# Patient Record
Sex: MALE | Race: OTHER | Employment: FULL TIME | URBAN - METROPOLITAN AREA
[De-identification: names, ages, dates, MRNs, and addresses within clinical notes are randomized per-mention and may not be internally consistent; named-entity substitution may affect disease eponyms.]

---

## 2018-01-06 ENCOUNTER — HOSPITAL ENCOUNTER (OUTPATIENT)
Age: 38
Discharge: HOME | End: 2018-01-06
Payer: COMMERCIAL

## 2018-01-06 VITALS
RESPIRATION RATE: 18 BRPM | OXYGEN SATURATION: 98 % | HEART RATE: 108 BPM | HEIGHT: 66 IN | DIASTOLIC BLOOD PRESSURE: 93 MMHG | WEIGHT: 185 LBS | BODY MASS INDEX: 29.73 KG/M2 | TEMPERATURE: 98 F | SYSTOLIC BLOOD PRESSURE: 135 MMHG

## 2018-01-06 DIAGNOSIS — J11.1 INFLUENZA: Primary | ICD-10-CM

## 2018-01-06 PROCEDURE — 94640 AIRWAY INHALATION TREATMENT: CPT

## 2018-01-06 PROCEDURE — 99214 OFFICE O/P EST MOD 30 MIN: CPT

## 2018-01-06 RX ORDER — ALBUTEROL SULFATE 90 UG/1
2 AEROSOL, METERED RESPIRATORY (INHALATION) EVERY 4 HOURS PRN
Qty: 1 INHALER | Refills: 0 | Status: SHIPPED | OUTPATIENT
Start: 2018-01-06 | End: 2018-02-05

## 2018-01-06 RX ORDER — IBUPROFEN 600 MG/1
600 TABLET ORAL ONCE
Status: COMPLETED | OUTPATIENT
Start: 2018-01-06 | End: 2018-01-06

## 2018-01-06 RX ORDER — OSELTAMIVIR PHOSPHATE 75 MG/1
75 CAPSULE ORAL 2 TIMES DAILY
Qty: 10 CAPSULE | Refills: 0 | Status: SHIPPED | OUTPATIENT
Start: 2018-01-06 | End: 2018-01-11

## 2018-01-06 RX ORDER — OSELTAMIVIR PHOSPHATE 75 MG/1
75 CAPSULE ORAL 2 TIMES DAILY
Qty: 10 CAPSULE | Refills: 0 | Status: SHIPPED | OUTPATIENT
Start: 2018-01-06 | End: 2018-01-06

## 2018-01-06 RX ORDER — IPRATROPIUM BROMIDE AND ALBUTEROL SULFATE 2.5; .5 MG/3ML; MG/3ML
3 SOLUTION RESPIRATORY (INHALATION) ONCE
Status: COMPLETED | OUTPATIENT
Start: 2018-01-06 | End: 2018-01-06

## 2018-01-06 NOTE — ED PROVIDER NOTES
Patient Seen in: [facility]    History   Patient presents with:  Cough  Fever    Stated Complaint: Cough, chills and fever    HPI    CHIEF COMPLAINT: Flulike symptoms     HISTORY OF PRESENT ILLNESS: Patient is a 49-year-old male 1015]  BP: 127/80  Pulse: 113  Resp: 20  Temp: 99.3 °F (37.4 °C)  Temp src: Oral  SpO2: 98 %  O2 Device: None (Room air)    Current:/93   Pulse 108   Temp 98.1 °F (36.7 °C) (Oral)   Resp 18   Ht 167.6 cm (5' 6\")   Wt 83.9 kg   SpO2 98%   BMI 29.86 k diagnosis)    Disposition:  Discharge  1/6/2018 11:34 am    Follow-up:  Primary care provider    In 2 days  If symptoms worsen        Medications Prescribed:  Current Discharge Medication List    START taking these medications    Oseltamivir Phosphate (LANDRUM

## 2018-01-06 NOTE — ED INITIAL ASSESSMENT (HPI)
Cough- 2-3 days  With phlegm felt feverish took ibuprofen 2 tabs  and tylenol 2 tabs last dose at 7 am.   Denies any h/o pneumonia ro bronchitis. No flu shot received this year.     Stuffy/runny nose- started yesterday

## 2022-09-16 ENCOUNTER — NON-APPOINTMENT (OUTPATIENT)
Age: 42
End: 2022-09-16

## 2022-09-16 PROBLEM — Z00.00 ENCOUNTER FOR PREVENTIVE HEALTH EXAMINATION: Status: ACTIVE | Noted: 2022-09-16

## 2022-09-22 ENCOUNTER — NON-APPOINTMENT (OUTPATIENT)
Age: 42
End: 2022-09-22

## 2022-09-27 ENCOUNTER — APPOINTMENT (OUTPATIENT)
Dept: OTOLARYNGOLOGY | Facility: CLINIC | Age: 42
End: 2022-09-27

## 2022-09-27 VITALS
HEART RATE: 56 BPM | SYSTOLIC BLOOD PRESSURE: 124 MMHG | TEMPERATURE: 97.6 F | DIASTOLIC BLOOD PRESSURE: 78 MMHG | WEIGHT: 140 LBS | OXYGEN SATURATION: 98 % | HEIGHT: 71 IN | BODY MASS INDEX: 19.6 KG/M2

## 2022-09-27 DIAGNOSIS — R73.03 PREDIABETES.: ICD-10-CM

## 2022-09-27 DIAGNOSIS — Z78.9 OTHER SPECIFIED HEALTH STATUS: ICD-10-CM

## 2022-09-27 DIAGNOSIS — Z83.3 FAMILY HISTORY OF DIABETES MELLITUS: ICD-10-CM

## 2022-09-27 DIAGNOSIS — K14.8 OTHER DISEASES OF TONGUE: ICD-10-CM

## 2022-09-27 DIAGNOSIS — Z82.49 FAMILY HISTORY OF ISCHEMIC HEART DISEASE AND OTHER DISEASES OF THE CIRCULATORY SYSTEM: ICD-10-CM

## 2022-09-27 PROCEDURE — 99203 OFFICE O/P NEW LOW 30 MIN: CPT

## 2022-09-27 RX ORDER — METFORMIN HYDROCHLORIDE 500 MG/1
500 TABLET, COATED ORAL
Refills: 0 | Status: ACTIVE | COMMUNITY

## 2022-09-27 NOTE — HISTORY OF PRESENT ILLNESS
[de-identified] : 42M presents with intermittent "bumps on sides of tongue." that he's noted for 1 year. In addition, about 6 months ago he noted a raised area on posterior left tongue. 3 months ago he noted a similar raised area on the right tongue. The areas have not changed in size, shape or color. No pain, bleeding or ulceration. Never smoker, 6 alcoholic drinks per week.  No fevers, chills, night sweats or weight loss. Family history of tongue cancer in father which was recently diagnosed. No other family history of cancer.  [FreeTextEntry1] : No pain fever chills nausea vomiting, no dysphagia no other systemic signs of disease.

## 2022-09-27 NOTE — HISTORY OF PRESENT ILLNESS
[de-identified] : 42M presents with intermittent "bumps on sides of tongue." that he's noted for 1 year. In addition, about 6 months ago he noted a raised area on posterior left tongue. 3 months ago he noted a similar raised area on the right tongue. The areas have not changed in size, shape or color. No pain, bleeding or ulceration. Never smoker, 6 alcoholic drinks per week.  No fevers, chills, night sweats or weight loss. Family history of tongue cancer in father which was recently diagnosed. No other family history of cancer.  [FreeTextEntry1] : No pain fever chills nausea vomiting, no dysphagia no other systemic signs of disease.